# Patient Record
Sex: FEMALE | Race: WHITE
[De-identification: names, ages, dates, MRNs, and addresses within clinical notes are randomized per-mention and may not be internally consistent; named-entity substitution may affect disease eponyms.]

---

## 2017-06-16 ENCOUNTER — HOSPITAL ENCOUNTER (OUTPATIENT)
Dept: HOSPITAL 75 - RAD | Age: 47
End: 2017-06-16
Attending: NURSE PRACTITIONER
Payer: COMMERCIAL

## 2017-06-16 DIAGNOSIS — M54.2: Primary | ICD-10-CM

## 2017-06-16 PROCEDURE — 72040 X-RAY EXAM NECK SPINE 2-3 VW: CPT

## 2017-06-16 NOTE — DIAGNOSTIC IMAGING REPORT
INDICATION: Neck pain after the patient felt a pop six weeks ago.



DISCUSSION: 3 views of the cervical spine were obtained, no

comparison. There is mild straightening of the normal cervical

lordosis which is commonly seen with positioning or muscle spasm.

No significant degenerative disc disease or facet arthropathy is

identified at any level. No fracture or subluxation. The

paraspinal soft tissues are unremarkable.



IMPRESSION:

Negative 3 views of the cervical spine.



Dictated by: 



  Dictated on workstation # [id number]

## 2017-07-06 ENCOUNTER — HOSPITAL ENCOUNTER (OUTPATIENT)
Dept: HOSPITAL 75 - RAD | Age: 47
End: 2017-07-06
Attending: NURSE PRACTITIONER
Payer: COMMERCIAL

## 2017-07-06 DIAGNOSIS — Z12.31: Primary | ICD-10-CM

## 2017-07-06 PROCEDURE — 77067 SCR MAMMO BI INCL CAD: CPT

## 2017-07-07 NOTE — DIAGNOSTIC IMAGING REPORT
Bilateral screening mammogram



The current study was also evaluated with a Computer Aided

Detection (CAD) system.



INDICATION: Screening. No current complaints stated on the

questionnaire.



COMPARISON: 03/10/2016.



FINDINGS:

The breasts are composed of heterogeneously dense parenchyma

which would decrease mammographic sensitivity. There are punctate

calcifications seen. Allowing for technique and positional

differences, no suspicious change is seen.



IMPRESSION: Dense breasts with no definite change. 



ACR BI-RADS Category 2: Benign findings.

Result letter will be mailed to the patient.

Note: At least 10% of breast cancer is not imaged by mammography.



  Dictated on workstation # [id number]

## 2017-12-21 ENCOUNTER — HOSPITAL ENCOUNTER (OUTPATIENT)
Dept: HOSPITAL 75 - RAD | Age: 47
End: 2017-12-21
Attending: NURSE PRACTITIONER
Payer: COMMERCIAL

## 2017-12-21 DIAGNOSIS — M50.20: ICD-10-CM

## 2017-12-21 DIAGNOSIS — M47.812: ICD-10-CM

## 2017-12-21 DIAGNOSIS — M48.02: Primary | ICD-10-CM

## 2017-12-21 DIAGNOSIS — Z85.3: ICD-10-CM

## 2017-12-21 PROCEDURE — 72141 MRI NECK SPINE W/O DYE: CPT

## 2017-12-21 NOTE — DIAGNOSTIC IMAGING REPORT
PROCEDURE: MR imaging cervical spine without contrast.



TECHNIQUE: Multiplanar, multisequence MR imaging of the cervical

spine was performed without contrast.



INDICATION: Neck pain. History of breast cancer. 



COMPARISON: Cervical spine radiographs of 06/16/2017.



FINDINGS: Normal alignment. Vertebral body heights are preserved.

Normal bone marrow signal. Mild degenerative endplate changes at

C3-C5. No abnormal signal in the cervical spinal cord. The

visualized paravertebral soft tissues are unremarkable.



C2-C3: No spinal canal or neuroforaminal narrowing.



C3-C4: Left paracentral disc protrusion contacts the ventral

cervical cord but results in only mild spinal canal narrowing. No

neuroforaminal narrowing.



C4-C5: Central disc protrusion results in moderate spinal canal

narrowing. Uncovertebral and facet arthropathy result in moderate

bilateral neuroforaminal narrowing.



C5-C6: Left paracentral disc protrusion contacts the left ventral

cervical cord and results in mild-to-moderate spinal canal

narrowing. No neuroforaminal narrowing.



C6-C7: Broad-based disc and osteophyte complex results in only

mild spinal canal narrowing. Uncovertebral hypertrophy results in

moderate right neuroforaminal narrowing. No substantial left

neuroforaminal narrowing.



C7-T1: No spinal canal or neuroforaminal narrowing.



IMPRESSION:

1. Spondylotic changes result in scattered mild-to-moderate

spinal canal narrowing, most marked at C4-C5. No abnormal signal

in the cervical spinal cord.

2. Scattered mild and moderate neuroforaminal narrowing, detailed

above.



Dictated by: 



  Dictated on workstation # [id number]

## 2019-06-10 ENCOUNTER — HOSPITAL ENCOUNTER (OUTPATIENT)
Dept: HOSPITAL 75 - RAD | Age: 49
End: 2019-06-10
Attending: NURSE PRACTITIONER
Payer: COMMERCIAL

## 2019-06-10 DIAGNOSIS — Z12.31: Primary | ICD-10-CM

## 2019-06-10 DIAGNOSIS — Z98.890: ICD-10-CM

## 2019-06-10 PROCEDURE — 77067 SCR MAMMO BI INCL CAD: CPT

## 2019-06-11 NOTE — DIAGNOSTIC IMAGING REPORT
EXAMINATION:

Digital mammogram bilateral screening with 3D tomosynthesis and

CAD. 



INDICATION: 

Screening.



COMPARISON: 

This study is compared to the prior exams of 07/06/2017,

03/10/2016, and 08/22/2014.



PERSONAL HISTORY: 

At this time, there are no current complaints. The patient did

undergo a lumpectomy for carcinoma of the left breast in 2011.



FINDINGS:

The fibroglandular tissue in both breasts is heterogeneously

dense. This does limit the sensitivity of this exam. The post

surgical and post treatment changes involving the left breast

seen previously are again evident and no different. There is no

primary or secondary sign of malignancy noted. 



IMPRESSION: 

There is no evidence for malignancy.



ACR BI-RADS Category 1: Negative.

Result letter will be mailed to the patient.

Note:  At least 10% of breast cancer is not imaged by

mammography.



Dictated by: 



  Dictated on workstation # [id number]

## 2019-12-12 ENCOUNTER — HOSPITAL ENCOUNTER (OUTPATIENT)
Dept: HOSPITAL 75 - CARD | Age: 49
End: 2019-12-12
Attending: INTERNAL MEDICINE
Payer: COMMERCIAL

## 2019-12-12 DIAGNOSIS — E78.2: Primary | ICD-10-CM

## 2019-12-12 DIAGNOSIS — I10: ICD-10-CM

## 2019-12-12 DIAGNOSIS — E11.9: ICD-10-CM

## 2019-12-12 DIAGNOSIS — R00.2: ICD-10-CM

## 2019-12-12 PROCEDURE — 93306 TTE W/DOPPLER COMPLETE: CPT

## 2020-05-13 ENCOUNTER — HOSPITAL ENCOUNTER (OUTPATIENT)
Dept: HOSPITAL 75 - LABNPT | Age: 50
End: 2020-05-13
Attending: FAMILY MEDICINE
Payer: COMMERCIAL

## 2020-05-13 DIAGNOSIS — J02.9: Primary | ICD-10-CM

## 2020-05-13 DIAGNOSIS — R05: ICD-10-CM

## 2020-05-13 DIAGNOSIS — R68.83: ICD-10-CM

## 2020-05-13 DIAGNOSIS — Z85.3: ICD-10-CM

## 2020-05-13 PROCEDURE — 87430 STREP A AG IA: CPT

## 2020-05-13 PROCEDURE — 87635 SARS-COV-2 COVID-19 AMP PRB: CPT

## 2020-05-13 PROCEDURE — 87804 INFLUENZA ASSAY W/OPTIC: CPT

## 2022-07-13 ENCOUNTER — HOSPITAL ENCOUNTER (OUTPATIENT)
Dept: HOSPITAL 75 - RAD | Age: 52
End: 2022-07-13
Attending: NURSE PRACTITIONER
Payer: COMMERCIAL

## 2022-07-13 DIAGNOSIS — Z12.31: Primary | ICD-10-CM

## 2022-07-13 PROCEDURE — 77063 BREAST TOMOSYNTHESIS BI: CPT

## 2022-07-13 PROCEDURE — 77067 SCR MAMMO BI INCL CAD: CPT

## 2022-07-14 NOTE — DIAGNOSTIC IMAGING REPORT
Indication: Routine screening.



Comparison is made with prior mammograms 06/10/2019 and

07/06/2017.



2-D and 3-D bilateral screening mammography was performed with

CAD.



Both breasts are heterogeneously dense, limiting the sensitivity

of mammography. Post-therapeutic changes in the left breast are

again noted. This appears to be stable. No mass or

malignant-appearing microcalcifications are seen. There are

benign calcifications present. Axillae are unremarkable.



IMPRESSION: BI-RADS Category 2



No mammographic features suspicious for malignancy are

identified.



ACR BI-RADS Category 2: Benign findings.

Result letter will be mailed to the patient.

Note: At least 10% of breast cancer is not imaged by mammography.



Dictated by: 



  Dictated on workstation # [id number]

## 2022-07-29 ENCOUNTER — HOSPITAL ENCOUNTER (OUTPATIENT)
Dept: HOSPITAL 75 - RAD | Age: 52
End: 2022-07-29
Attending: NURSE PRACTITIONER
Payer: COMMERCIAL

## 2022-07-29 DIAGNOSIS — M54.2: Primary | ICD-10-CM

## 2022-07-29 PROCEDURE — 72040 X-RAY EXAM NECK SPINE 2-3 VW: CPT

## 2022-07-29 NOTE — DIAGNOSTIC IMAGING REPORT
INDICATION: Neck pain



COMPARISON: 06/16/2017



FINDINGS: Frontal, lateral, swimmers and odontoid views of the

cervical spine were submitted. The cervical spine is visualized

up to the C7 level on the lateral projection. C7-T1 level is

obscured.



Static alignment shows straightening of normal lordotic curvature

of the cervical spine. There is however no significant

anteroretrolisthesis. Vertebral body heights are maintained.

There is no evidence of fracture or bone destruction. No

prevertebral soft tissue swelling is seen. No significant

degenerative changes are noted.



The open-mouth view demonstrates normal C1/C2 alignment.



IMPRESSION: 

1. Normal cervical spine series.



Dictated by: 



  Dictated on workstation # [id number]

## 2023-02-15 ENCOUNTER — HOSPITAL ENCOUNTER (OUTPATIENT)
Dept: HOSPITAL 75 - CARD | Age: 53
End: 2023-02-15
Attending: INTERNAL MEDICINE
Payer: COMMERCIAL

## 2023-02-15 VITALS — SYSTOLIC BLOOD PRESSURE: 120 MMHG | DIASTOLIC BLOOD PRESSURE: 71 MMHG

## 2023-02-15 DIAGNOSIS — I11.9: Primary | ICD-10-CM

## 2023-02-15 DIAGNOSIS — I25.10: ICD-10-CM

## 2023-02-15 PROCEDURE — 93017 CV STRESS TEST TRACING ONLY: CPT

## 2023-02-15 PROCEDURE — 93306 TTE W/DOPPLER COMPLETE: CPT

## 2023-02-15 NOTE — CARDIOLOGY STRESS TEST REPORT
Stress Test Report


Date of Procedure/Referring:


Date of Procedure:  Feb 15, 2023


PCP


Adriana Moya MD


Admitting Physician


Admitting Physician:


 








Attending Physician:


Amol Muniz MD





Indications:


CP





Baseline Heart Rate:


94





Baseline Blood Pressure:


Blood Pressure Systolic:  120


Blood Pressure Diastolic:  71





Baseline EKG:


Baseline EKG:  NSR





Summary/Conclusion:


Summary:


In summary, the patient started exercising with a baseline heart rate, blood


pressure and EKG mentioned above





Patient was able to exercise for a total of 7.30 minutes on Zane protocol,  

METs 9.1


Maximum heart rate 156      


Maximum blood pressure 199/62


   


Stress EKG, Minimal nondiagnostic changes 


Recovery EKG   , Return to baseline 





Conclusion:


Fair exercise tolerance for a total of 7 minutes and 30 seconds on Zane 

protocol 9.1 METS achieving 92% of maximal expected heart rate


Hypertensive response to exercise with peak blood pressure 199/62 return to 

baseline during recovery


Appropriate heart rate response to exercise with exercise-induced multiple short

bursts of paroxysmal atrial tachycardia/short episodes of atrial fibrillation 

resolved late in recovery


Nondiagnostic EKG changes with exercise return to baseline during recovery


Recommend event monitor for 2 weeks





Copy


Copies To 1:   ADRIANA MOYA MD, BASHAR J MD              Feb 15, 2023 17:02

## 2023-08-07 ENCOUNTER — HOSPITAL ENCOUNTER (OUTPATIENT)
Dept: HOSPITAL 75 - RAD | Age: 53
End: 2023-08-07
Attending: NURSE PRACTITIONER
Payer: COMMERCIAL

## 2023-08-07 ENCOUNTER — HOSPITAL ENCOUNTER (OUTPATIENT)
Dept: HOSPITAL 75 - RAD | Age: 53
End: 2023-08-07
Attending: INTERNAL MEDICINE
Payer: COMMERCIAL

## 2023-08-07 DIAGNOSIS — I65.29: ICD-10-CM

## 2023-08-07 DIAGNOSIS — E78.2: Primary | ICD-10-CM

## 2023-08-07 DIAGNOSIS — Z12.31: Primary | ICD-10-CM

## 2023-08-07 DIAGNOSIS — I48.0: ICD-10-CM

## 2023-08-07 DIAGNOSIS — I10: ICD-10-CM

## 2023-08-07 LAB
CREAT SERPL-MCNC: 0.83 MG/DL (ref 0.6–1.3)
GFR SERPLBLD BASED ON 1.73 SQ M-ARVRAT: 85 ML/MIN

## 2023-08-07 PROCEDURE — 75572 CT HRT W/3D IMAGE: CPT

## 2023-08-07 PROCEDURE — 36415 COLL VENOUS BLD VENIPUNCTURE: CPT

## 2023-08-07 PROCEDURE — 77063 BREAST TOMOSYNTHESIS BI: CPT

## 2023-08-07 PROCEDURE — 77067 SCR MAMMO BI INCL CAD: CPT

## 2023-08-07 PROCEDURE — 82565 ASSAY OF CREATININE: CPT

## 2023-08-07 NOTE — DIAGNOSTIC IMAGING REPORT
INDICATION: Hyperlipidemia and hypertension.



CT coronary artery study performed with precontrast images

followed by post-IV contrast images with EKG gating and 3-D

reconstructions. Dose reduction protocol was used.  There is no

prior study for comparison. There are some areas of motion

artifact.



Visualized portions of the lung fields were clear. There are no

enlarged mediastinal or hilar nodes. The entirety of the lungs

are not included in the study. There is no pleural or pericardial

fluid. Visualized portions of the aorta are not aneurysmal. There

are no significant valvular calcifications. There are no abnormal

filling defects in the cardiac chambers.



The left main coronary artery and circumflex coronary artery and

LAD are patent. There are no significant coronary calcifications.

Right coronary artery is patent and without significant stenosis.



IMPRESSION:



Unremarkable CT cardiac study.



Dictated by: 



  Dictated on workstation # [id number]

## 2023-08-07 NOTE — DIAGNOSTIC IMAGING REPORT
INDICATION: 

Routine screening.



COMPARISON: 

07/13/2022 and 06/10/2019.



TECHNIQUE: 

2D and 3D bilateral screening mammography was performed with CAD.



FINDINGS:

Both breasts are heterogeneously dense, limiting the sensitivity

of mammography. Post-therapeutic changes in the left breast are

stable. No mass or malignant-appearing microcalcifications are

seen. There are benign calcifications present. The axillae are

unremarkable.



IMPRESSION: 

No mammographic features suspicious for malignancy are

identified.



ACR BI-RADS Category 2: Benign findings.

Result letter will be mailed to the patient.

Note: At least 10% of breast cancer is not imaged by mammography.



Dictated by: 



  Dictated on workstation # [id number]